# Patient Record
Sex: FEMALE | URBAN - METROPOLITAN AREA
[De-identification: names, ages, dates, MRNs, and addresses within clinical notes are randomized per-mention and may not be internally consistent; named-entity substitution may affect disease eponyms.]

---

## 2022-04-15 ENCOUNTER — OUT OF OFFICE VISIT (OUTPATIENT)
Dept: URBAN - METROPOLITAN AREA MEDICAL CENTER 25 | Facility: MEDICAL CENTER | Age: 57
End: 2022-04-15
Payer: COMMERCIAL

## 2022-04-15 ENCOUNTER — LAB OUTSIDE AN ENCOUNTER (OUTPATIENT)
Dept: URBAN - METROPOLITAN AREA CLINIC 19 | Facility: CLINIC | Age: 57
End: 2022-04-15

## 2022-04-15 DIAGNOSIS — R74.8 ABNORMAL ALKALINE PHOSPHATASE TEST: ICD-10-CM

## 2022-04-15 DIAGNOSIS — R93.3 ABN FINDINGS-GI TRACT: ICD-10-CM

## 2022-04-15 DIAGNOSIS — K85.10 ACUTE BILIARY PANCREATITIS: ICD-10-CM

## 2022-04-15 PROCEDURE — 99222 1ST HOSP IP/OBS MODERATE 55: CPT | Performed by: PHYSICIAN ASSISTANT

## 2022-04-15 PROCEDURE — G8427 DOCREV CUR MEDS BY ELIG CLIN: HCPCS | Performed by: PHYSICIAN ASSISTANT

## 2022-04-16 ENCOUNTER — OUT OF OFFICE VISIT (OUTPATIENT)
Dept: URBAN - METROPOLITAN AREA MEDICAL CENTER 25 | Facility: MEDICAL CENTER | Age: 57
End: 2022-04-16
Payer: COMMERCIAL

## 2022-04-16 DIAGNOSIS — K85.10 ACUTE BILIARY PANCREATITIS: ICD-10-CM

## 2022-04-16 DIAGNOSIS — R93.3 ABN FINDINGS-GI TRACT: ICD-10-CM

## 2022-04-16 DIAGNOSIS — R74.8 ABNORMAL ALKALINE PHOSPHATASE TEST: ICD-10-CM

## 2022-04-16 PROCEDURE — 99232 SBSQ HOSP IP/OBS MODERATE 35: CPT | Performed by: INTERNAL MEDICINE

## 2022-04-16 PROCEDURE — 99233 SBSQ HOSP IP/OBS HIGH 50: CPT | Performed by: INTERNAL MEDICINE

## 2024-04-09 ENCOUNTER — LAB (OUTPATIENT)
Dept: URBAN - METROPOLITAN AREA CLINIC 19 | Facility: CLINIC | Age: 59
End: 2024-04-09

## 2024-04-09 ENCOUNTER — OV NP (OUTPATIENT)
Dept: URBAN - METROPOLITAN AREA CLINIC 19 | Facility: CLINIC | Age: 59
End: 2024-04-09
Payer: COMMERCIAL

## 2024-04-09 VITALS
OXYGEN SATURATION: 98 % | HEIGHT: 60 IN | HEART RATE: 60 BPM | DIASTOLIC BLOOD PRESSURE: 82 MMHG | BODY MASS INDEX: 35.22 KG/M2 | WEIGHT: 179.4 LBS | SYSTOLIC BLOOD PRESSURE: 140 MMHG | TEMPERATURE: 97.4 F

## 2024-04-09 DIAGNOSIS — Z12.11 SCREEN FOR COLON CANCER: ICD-10-CM

## 2024-04-09 DIAGNOSIS — K52.89 OTHER SPECIFIED NONINFECTIVE GASTROENTERITIS AND COLITIS: ICD-10-CM

## 2024-04-09 DIAGNOSIS — R10.31 RLQ ABDOMINAL PAIN: ICD-10-CM

## 2024-04-09 PROCEDURE — 99214 OFFICE O/P EST MOD 30 MIN: CPT | Performed by: NURSE PRACTITIONER

## 2024-04-09 NOTE — HPI-TODAY'S VISIT:
Ms. Maldonado is a 58 yo female with PMH of seizures (last one was many years ago, was taken off all meds), HTN, Raynaud's who presents for chronic diarrhea.   gallbladder removed 4/2022 4 yrs ago by Dr. Brigido Price and diarrhea ever since. Has been taking colestipol once/day since then and no relief.  Had recent labwork done and no concerns.  Denies fever/chills or sick contacts. No new meds or diet chnage. No recent travel.  Diarrhea 3-4 times/day, watery/mushy. No bloody or black tarry stools. Occasional abdominal pain to mid pelvic area past few days.  Has never had a colonoscopy. Unknown family history as she was adopted. Here today with her adopted Mother..  No cardio/pulm/kidney disease. Not on any AC except baby aspirin as preventative.

## 2024-04-09 NOTE — PHYSICAL EXAM GASTROINTESTINAL
Abdomen , soft, RLQ pain upon palpation, nondistended , no guarding or rigidity , no masses palpable , normal bowel sounds , Liver and Spleen , no hepatomegaly present , no hepatosplenomegaly , liver nontender , spleen not palpable

## 2024-04-22 LAB
CALPROTECTIN, FECAL: 16
CAMPYLOBACTER SPP. AG,EIA: (no result)
CLOSTRIDIUM DIFFICILE: (no result)
PANCREATIC ELASTASE, FECAL: >500
SALMONELLA AND SHIGELLA, CULTURE: (no result)
SHIGA TOXINS, EIA W/RFL TO E.COLI O157 CULTURE: (no result)

## 2024-05-16 ENCOUNTER — OFFICE VISIT (OUTPATIENT)
Dept: URBAN - METROPOLITAN AREA SURGERY CENTER 31 | Facility: SURGERY CENTER | Age: 59
End: 2024-05-16

## 2024-05-16 RX ORDER — CHOLESTYRAMINE 4 G/9G
1 SCOOP POWDER, FOR SUSPENSION ORAL ONCE A DAY
Qty: 30 | Refills: 5 | Status: ACTIVE | COMMUNITY
Start: 2024-04-10

## 2024-05-16 RX ORDER — COLESEVELAM HYDROCHLORIDE 625 MG/1
3 TABLETS WITH MEALS TABLET, FILM COATED ORAL TWICE A DAY
Qty: 180 | Refills: 0 | Status: ACTIVE | COMMUNITY
Start: 2024-04-22

## 2024-05-17 ENCOUNTER — TELEPHONE ENCOUNTER (OUTPATIENT)
Dept: URBAN - METROPOLITAN AREA CLINIC 19 | Facility: CLINIC | Age: 59
End: 2024-05-17

## 2024-08-21 ENCOUNTER — TELEPHONE ENCOUNTER (OUTPATIENT)
Dept: URBAN - METROPOLITAN AREA CLINIC 19 | Facility: CLINIC | Age: 59
End: 2024-08-21

## 2024-12-09 ENCOUNTER — DASHBOARD ENCOUNTERS (OUTPATIENT)
Age: 59
End: 2024-12-09

## 2024-12-09 ENCOUNTER — OFFICE VISIT (OUTPATIENT)
Dept: URBAN - METROPOLITAN AREA CLINIC 19 | Facility: CLINIC | Age: 59
End: 2024-12-09
Payer: COMMERCIAL

## 2024-12-09 VITALS — HEIGHT: 60 IN

## 2024-12-09 DIAGNOSIS — R10.13 EPIGASTRIC PAIN: ICD-10-CM

## 2024-12-09 DIAGNOSIS — R10.11 RUQ ABDOMINAL PAIN: ICD-10-CM

## 2024-12-09 DIAGNOSIS — K76.0 FATTY LIVER: ICD-10-CM

## 2024-12-09 PROCEDURE — 99213 OFFICE O/P EST LOW 20 MIN: CPT | Performed by: NURSE PRACTITIONER

## 2024-12-09 RX ORDER — COLESEVELAM HYDROCHLORIDE 625 MG/1
3 TABLETS WITH MEALS TABLET, FILM COATED ORAL TWICE A DAY
Qty: 180 | Refills: 0 | Status: ACTIVE | COMMUNITY
Start: 2024-04-22

## 2024-12-09 RX ORDER — OMEPRAZOLE 20 MG/1
1 CAPSULE 1/2 TO 1 HOUR BEFORE MORNING MEAL CAPSULE, DELAYED RELEASE ORAL ONCE A DAY
Qty: 30 | Refills: 1 | OUTPATIENT
Start: 2024-12-09

## 2024-12-09 RX ORDER — CHOLESTYRAMINE 4 G/9G
1 SCOOP POWDER, FOR SUSPENSION ORAL ONCE A DAY
Qty: 30 | Refills: 5 | Status: ACTIVE | COMMUNITY
Start: 2024-04-10

## 2024-12-09 NOTE — HPI-TODAY'S VISIT:
4/9/2024 (DREAD Calloway): Ms. Maldonado is a 58 yo female with PMH of seizures (last one was many years ago, was taken off all meds), HTN, Raynaud's who presents for chronic diarrhea. gallbladder removed 4/2022 4 yrs ago by Dr. Brigido Price and diarrhea ever since. Has been taking colestipol once/day since then and no relief. Had recent labwork done and no concerns.  Denies fever/chills or sick contacts. No new meds or diet chnage. No recent travel. Diarrhea 3-4 times/day, watery/mushy. No bloody or black tarry stools. Occasional abdominal pain to mid pelvic area past few days. Has never had a colonoscopy. Unknown family history as she was adopted. Here today with her adopted Mother.. No cardio/pulm/kidney disease. Not on any AC except baby aspirin as preventative.    4/15/2024 stool studies were negative for C. difficile, stool culture negative, pancreatic elastase normal, fecal calprotectin normal   5/10/2024 CT A/P with contrast: Scant diverticulosis without diverticulitis.  Small hiatal hernia.  No urinary or intestinal obstruction.  Of note, mention of small lesion too small to characterize but likely cyst or hemangioma and did not require CT follow-up  5/16/2024 colonoscopy with Dr. Xavi chawla prep; normal; no specimens collected 10-year follow-up given    12/9/2024 (DREAD Calloway): Here for ER referral. She presented to ER complaining of right flank/right sided rib pain. Had multiple evaluations by multiple doctors and symptoms resolved transiently and then returned. Also reported some intermittent epigastric pain Chest x-ray clear 11/29/2024 CT A/P with contrast: No acute abnormality seen in the abdomen or pelvis.  Mild diffuse fatty infiltration of the liver.  Stable small hiatal hernia. Labs: WBC 6.0, Hg 13.1, MCV 94.9,  CMP unremarkable, lipase normal 49  She reports she is still having the RUQ pain - describes as tingling, worse when stretching or moving. Mother says she has had this issue for many years.  Mother says she put her on oral iron to help her diarrhea after gallbladder removed. She was already on questran that did not help. She figured b/c iron can be constipating and sates this has helped. She denies heartburn or reflux. Denies NSAID use or ETOH.

## 2025-01-13 ENCOUNTER — OFFICE VISIT (OUTPATIENT)
Dept: URBAN - METROPOLITAN AREA CLINIC 19 | Facility: CLINIC | Age: 60
End: 2025-01-13
Payer: COMMERCIAL

## 2025-01-13 VITALS
TEMPERATURE: 97.7 F | BODY MASS INDEX: 35.77 KG/M2 | SYSTOLIC BLOOD PRESSURE: 128 MMHG | HEIGHT: 60 IN | WEIGHT: 182.2 LBS | DIASTOLIC BLOOD PRESSURE: 80 MMHG | HEART RATE: 66 BPM | OXYGEN SATURATION: 98 %

## 2025-01-13 DIAGNOSIS — R10.11 RUQ ABDOMINAL PAIN: ICD-10-CM

## 2025-01-13 PROCEDURE — 99212 OFFICE O/P EST SF 10 MIN: CPT | Performed by: NURSE PRACTITIONER

## 2025-01-13 RX ORDER — CHOLESTYRAMINE 4 G/9G
1 SCOOP POWDER, FOR SUSPENSION ORAL ONCE A DAY
Qty: 30 | Refills: 5 | Status: ACTIVE | COMMUNITY
Start: 2024-04-10

## 2025-01-13 RX ORDER — OMEPRAZOLE 20 MG/1
1 CAPSULE 1/2 TO 1 HOUR BEFORE MORNING MEAL CAPSULE, DELAYED RELEASE ORAL ONCE A DAY
Qty: 30 | Refills: 1 | Status: ACTIVE | COMMUNITY
Start: 2024-12-09

## 2025-01-13 RX ORDER — COLESEVELAM HYDROCHLORIDE 625 MG/1
3 TABLETS WITH MEALS TABLET, FILM COATED ORAL TWICE A DAY
Qty: 180 | Refills: 0 | Status: ACTIVE | COMMUNITY
Start: 2024-04-22

## 2025-01-13 NOTE — HPI-TODAY'S VISIT:
4/9/2024 (DREAD Calloway): Ms. Maldonado is a 60 yo female with PMH of seizures (last one was many years ago, was taken off all meds), HTN, Raynaud's who presents for chronic diarrhea. gallbladder removed 4/2022 4 yrs ago by Dr. Brigido Price and diarrhea ever since. Has been taking colestipol once/day since then and no relief. Had recent labwork done and no concerns.  Denies fever/chills or sick contacts. No new meds or diet chnage. No recent travel. Diarrhea 3-4 times/day, watery/mushy. No bloody or black tarry stools. Occasional abdominal pain to mid pelvic area past few days. Has never had a colonoscopy. Unknown family history as she was adopted. Here today with her adopted Mother.. No cardio/pulm/kidney disease. Not on any AC except baby aspirin as preventative.    4/15/2024 stool studies were negative for C. difficile, stool culture negative, pancreatic elastase normal, fecal calprotectin normal  5/10/2024 CT A/P with contrast: Scant diverticulosis without diverticulitis. Small hiatal hernia. No urinary or intestinal obstruction. Of note, mention of small lesion too small to characterize but likely cyst or hemangioma and did not require CT follow-up  5/16/2024 colonoscopy with Dr. Xavi chawla prep; normal; no specimens collected 10-year follow-up given    12/9/2024 (DREAD Calloway): Here for ER referral. She presented to ER complaining of right flank/right sided rib pain. Had multiple evaluations by multiple doctors and symptoms resolved transiently and then returned. Also reported some intermittent epigastric pain Chest x-ray clear 11/29/2024 CT A/P with contrast: No acute abnormality seen in the abdomen or pelvis. Mild diffuse fatty infiltration of the liver. Stable small hiatal hernia. Labs: WBC 6.0, Hg 13.1, MCV 94.9,  CMP unremarkable, lipase normal 49  She reports she is still having the RUQ pain - describes as tingling, worse when stretching or moving. Mother says she has had this issue for many years.  Mother says she put her on oral iron to help her diarrhea after gallbladder removed. She was already on questran that did not help. She figured b/c iron can be constipating and states this has helped. She denies heartburn or reflux. Denies NSAID use or ETOH.   1/13/2025 (DREAD Calloway): Last visit, we started omeprazole daily. Not much relief. Still RUQ/right flank pain especially if she turns in her bed or stretches. Pain is not associated with eating. She is also c/o new right hand numbness. No vision changes, headaches, dizziness, still has bialteral UE function. Has PCP yearly appt next month. She denies urinary symptoms.

## 2025-01-29 ENCOUNTER — TELEPHONE ENCOUNTER (OUTPATIENT)
Dept: URBAN - METROPOLITAN AREA CLINIC 19 | Facility: CLINIC | Age: 60
End: 2025-01-29

## 2025-03-21 ENCOUNTER — TELEPHONE ENCOUNTER (OUTPATIENT)
Dept: URBAN - METROPOLITAN AREA CLINIC 19 | Facility: CLINIC | Age: 60
End: 2025-03-21

## 2025-03-26 ENCOUNTER — OFFICE VISIT (OUTPATIENT)
Dept: URBAN - METROPOLITAN AREA MEDICAL CENTER 25 | Facility: MEDICAL CENTER | Age: 60
End: 2025-03-26

## 2025-04-15 ENCOUNTER — TELEPHONE ENCOUNTER (OUTPATIENT)
Dept: URBAN - METROPOLITAN AREA CLINIC 19 | Facility: CLINIC | Age: 60
End: 2025-04-15

## 2025-04-23 ENCOUNTER — OFFICE VISIT (OUTPATIENT)
Dept: URBAN - METROPOLITAN AREA CLINIC 19 | Facility: CLINIC | Age: 60
End: 2025-04-23
Payer: COMMERCIAL

## 2025-04-23 DIAGNOSIS — K76.0 FATTY LIVER: ICD-10-CM

## 2025-04-23 DIAGNOSIS — R19.7 CHRONIC DIARRHEA: ICD-10-CM

## 2025-04-23 DIAGNOSIS — R10.11 RUQ ABDOMINAL PAIN: ICD-10-CM

## 2025-04-23 PROCEDURE — 99214 OFFICE O/P EST MOD 30 MIN: CPT | Performed by: NURSE PRACTITIONER

## 2025-04-23 NOTE — HPI-TODAY'S VISIT:
4/9/2024 (DREAD Calloway): Ms. Maldonado is a 58 yo female with PMH of seizures (last one was many years ago, was taken off all meds), HTN, Raynaud's who presents for chronic diarrhea. gallbladder removed 4/2022 4 yrs ago by Dr. Brigido Price and diarrhea ever since. Has been taking colestipol once/day since then and no relief. Had recent labwork done and no concerns.  Denies fever/chills or sick contacts. No new meds or diet chnage. No recent travel. Diarrhea 3-4 times/day, watery/mushy. No bloody or black tarry stools. Occasional abdominal pain to mid pelvic area past few days. Has never had a colonoscopy. Unknown family history as she was adopted. Here today with her adopted Mother.. No cardio/pulm/kidney disease. Not on any AC except baby aspirin as preventative.    4/15/2024 stool studies were negative for C. difficile, stool culture negative, pancreatic elastase normal, fecal calprotectin normal  5/10/2024 CT A/P with contrast: Scant diverticulosis without diverticulitis. Small hiatal hernia. No urinary or intestinal obstruction. Of note, mention of small lesion too small to characterize but likely cyst or hemangioma and did not require CT follow-up  5/16/2024 colonoscopy with Dr. Xavi chawla prep; normal; no specimens collected 10-year follow-up given    12/9/2024 (DREAD Calloway): Here for ER referral. She presented to ER complaining of right flank/right sided rib pain. Had multiple evaluations by multiple doctors and symptoms resolved transiently and then returned. Also reported some intermittent epigastric pain Chest x-ray clear 11/29/2024 CT A/P with contrast: No acute abnormality seen in the abdomen or pelvis. Mild diffuse fatty infiltration of the liver. Stable small hiatal hernia. Labs: WBC 6.0, Hg 13.1, MCV 94.9,  CMP unremarkable, lipase normal 49  She reports she is still having the RUQ pain - describes as tingling, worse when stretching or moving. Mother says she has had this issue for many years.  Mother says she put her on oral iron to help her diarrhea after gallbladder removed. She was already on questran that did not help. She figured b/c iron can be constipating and states this has helped. She denies heartburn or reflux. Denies NSAID use or ETOH.   1/13/2025 (DREAD Calloway): Last visit, we started omeprazole daily. Not much relief. Still RUQ/right flank pain especially if she turns in her bed or stretches. Pain is not associated with eating. She is also c/o new right hand numbness. No vision changes, headaches, dizziness, still has bilateral UE function. Has PCP yearly appt next month. She denies urinary symptoms.   4/23/2025 (DREAD Calloway): Last visit, recommended EGD to evaluate her RUQ pain further especially if she did not get any relief with NSAIDs and her labs were stable and case the cause could have been MSK but she declined.  Absolutely no relief with PPI so she stopped it.  She later called in wanting to schedule but unfortunately the following month she suffered a fall and developed a hematoma on her stomach and she was referred to surgery Dr. Brigido Price who she says told them it would resolve on its own, no intervention required. Still with sharp RUQ pain, worse at night. Does not eat prior to bedtime. Takes iron to help with diarrhea.

## 2025-04-24 LAB
ALBUMIN/GLOBULIN RATIO: 1.5
ALBUMIN: 4.4
ALKALINE PHOSPHATASE: 74
ALT: 18
AST: 19
BILIRUBIN, DIRECT: 0.3
BILIRUBIN, INDIRECT: 1.4
BILIRUBIN, TOTAL: 1.7
C-REACTIVE PROTEIN, QUANT: <3
FERRITIN, SERUM: 56
FIB 4 INDEX: 1.14
FIB 4 INTERPRETATION: (no result)
GLOBULIN: 2.9
HBSAG SCREEN: (no result)
HEMATOCRIT: 40.4
HEMOGLOBIN: 13.3
HEP A AB, IGM: (no result)
HEP B CORE AB, IGM: (no result)
HEPATITIS C ANTIBODY: (no result)
IMMUNOGLOBULIN A, QN, SERUM: 284
INTERPRETATION: (no result)
IRON BIND.CAP.(TIBC): 340
IRON SATURATION: 37
IRON: 126
LIPASE: 24
MCH: 30.6
MCHC: 32.9
MCV: 93.1
MPV: 10.4
PLATELET COUNT: 236
PLATELET COUNT: 236
PROTEIN, TOTAL: 7.3
RDW: 11.8
RED BLOOD CELL COUNT: 4.34
T-TRANSGLUTAMINASE (TTG) IGA: <1
WHITE BLOOD CELL COUNT: 6

## 2025-04-28 ENCOUNTER — TELEPHONE ENCOUNTER (OUTPATIENT)
Dept: URBAN - METROPOLITAN AREA CLINIC 19 | Facility: CLINIC | Age: 60
End: 2025-04-28

## 2025-05-01 ENCOUNTER — CLAIMS CREATED FROM THE CLAIM WINDOW (OUTPATIENT)
Dept: URBAN - METROPOLITAN AREA SURGERY CENTER 31 | Facility: SURGERY CENTER | Age: 60
End: 2025-05-01
Payer: COMMERCIAL

## 2025-05-01 ENCOUNTER — CLAIMS CREATED FROM THE CLAIM WINDOW (OUTPATIENT)
Dept: URBAN - METROPOLITAN AREA CLINIC 4 | Facility: CLINIC | Age: 60
End: 2025-05-01
Payer: COMMERCIAL

## 2025-05-01 DIAGNOSIS — K31.89 ACHYLIA: ICD-10-CM

## 2025-05-01 DIAGNOSIS — K31.89 OTHER DISEASES OF STOMACH AND DUODENUM: ICD-10-CM

## 2025-05-01 DIAGNOSIS — K21.9 ACID REFLUX: ICD-10-CM

## 2025-05-01 DIAGNOSIS — K29.70 GASTRITIS, UNSPECIFIED, WITHOUT BLEEDING: ICD-10-CM

## 2025-05-01 DIAGNOSIS — K29.70 GASTRITIS WITHOUT BLEEDING, UNSPECIFIED CHRONICITY, UNSPECIFIED GASTRITIS TYPE: ICD-10-CM

## 2025-05-01 PROCEDURE — 43239 EGD BIOPSY SINGLE/MULTIPLE: CPT | Performed by: INTERNAL MEDICINE

## 2025-05-01 PROCEDURE — 00731 ANES UPR GI NDSC PX NOS: CPT | Performed by: NURSE ANESTHETIST, CERTIFIED REGISTERED

## 2025-05-01 PROCEDURE — 88305 TISSUE EXAM BY PATHOLOGIST: CPT | Performed by: PATHOLOGY

## 2025-05-15 ENCOUNTER — OFFICE VISIT (OUTPATIENT)
Dept: URBAN - METROPOLITAN AREA CLINIC 19 | Facility: CLINIC | Age: 60
End: 2025-05-15
Payer: COMMERCIAL

## 2025-05-15 DIAGNOSIS — K31.9 GASTROPATHY: ICD-10-CM

## 2025-05-15 DIAGNOSIS — E80.6 HYPERBILIRUBINEMIA: ICD-10-CM

## 2025-05-15 DIAGNOSIS — K76.0 FATTY LIVER: ICD-10-CM

## 2025-05-15 PROCEDURE — 99214 OFFICE O/P EST MOD 30 MIN: CPT | Performed by: NURSE PRACTITIONER

## 2025-05-15 NOTE — HPI-TODAY'S VISIT:
4/9/2024 (DREAD Calloway): Ms. Maldonado is a 60 yo female with PMH of seizures (last one was many years ago, was taken off all meds), HTN, Raynaud's who presents for chronic diarrhea. gallbladder removed 4/2022 4 yrs ago by Dr. Brigido Price and diarrhea ever since. Has been taking colestipol once/day since then and no relief. Had recent labwork done and no concerns.  Denies fever/chills or sick contacts. No new meds or diet chnage. No recent travel. Diarrhea 3-4 times/day, watery/mushy. No bloody or black tarry stools. Occasional abdominal pain to mid pelvic area past few days. Has never had a colonoscopy. Unknown family history as she was adopted. Here today with her adopted Mother.. No cardio/pulm/kidney disease. Not on any AC except baby aspirin as preventative.    4/15/2024 stool studies were negative for C. difficile, stool culture negative, pancreatic elastase normal, fecal calprotectin normal  5/10/2024 CT A/P with contrast: Scant diverticulosis without diverticulitis. Small hiatal hernia. No urinary or intestinal obstruction. Of note, mention of small lesion too small to characterize but likely cyst or hemangioma and did not require CT follow-up  5/16/2024 colonoscopy with Dr. Xavi chawla prep; normal; no specimens collected 10-year follow-up given    12/9/2024 (DREAD Calloway): Here for ER referral. She presented to ER complaining of right flank/right sided rib pain. Had multiple evaluations by multiple doctors and symptoms resolved transiently and then returned. Also reported some intermittent epigastric pain Chest x-ray clear 11/29/2024 CT A/P with contrast: No acute abnormality seen in the abdomen or pelvis. Mild diffuse fatty infiltration of the liver. Stable small hiatal hernia. Labs: WBC 6.0, Hg 13.1, MCV 94.9,  CMP unremarkable, lipase normal 49  She reports she is still having the RUQ pain - describes as tingling, worse when stretching or moving. Mother says she has had this issue for many years.  Mother says she put her on oral iron to help her diarrhea after gallbladder removed. She was already on questran that did not help. She figured b/c iron can be constipating and states this has helped. She denies heartburn or reflux. Denies NSAID use or ETOH.   1/13/2025 (DREAD Calloway): Last visit, we started omeprazole daily. Not much relief. Still RUQ/right flank pain especially if she turns in her bed or stretches. Pain is not associated with eating. She is also c/o new right hand numbness. No vision changes, headaches, dizziness, still has bilateral UE function. Has PCP yearly appt next month. She denies urinary symptoms.   4/23/2025 (DREAD Calloway): Last visit, recommended EGD to evaluate her RUQ pain further especially if she did not get any relief with NSAIDs and her labs were stable and case the cause could have been MSK but she declined. Absolutely no relief with PPI so she stopped it. She later called in wanting to schedule but unfortunately the following month she suffered a fall and developed a hematoma on her stomach and she was referred to surgery Dr. Brigido Price who she says told them it would resolve on its own, no intervention required. Still with sharp RUQ pain, worse at night. Does not eat prior to bedtime. Takes iron to help with diarrhea.    5/1/2025 EGD with Dr. Adams - normal esophagus Z-line regular at 38 cm from the incisors.  Small hiatal hernia.  Nonerosive gastritis.  Normal duodenum.  Path: Duodenum negative.  Stomach random biopsy chemical/reactive gastropathy; negative for H. pylori.   5/15/2025 (DREAD Calloway): Here for procedure follow-up. Her last labs from last visit were all normal except she was found to have isolated hyperbilirubinemia (I > D).  Had her come back in to do fasting labs and bilirubin levels remain the same T. bili 1.7, indirect bili 1.4, direct bili 0.3.  Hepatitis panel was negative.  Iron studies normal.  Celiac disease panel negative and CRP was normal.  Lipase normal.  She continues to report constant right-sided pain

## 2025-05-16 LAB
HAPTOGLOBIN: 112
LD: 147
RETICULOCYTE COUNT: 1.4
RETICULOCYTE, ABSOLUTE: (no result)